# Patient Record
Sex: MALE | Race: WHITE | Employment: FULL TIME | URBAN - METROPOLITAN AREA
[De-identification: names, ages, dates, MRNs, and addresses within clinical notes are randomized per-mention and may not be internally consistent; named-entity substitution may affect disease eponyms.]

---

## 2023-03-16 ENCOUNTER — HOSPITAL ENCOUNTER (INPATIENT)
Age: 53
LOS: 1 days | Discharge: HOME | DRG: 309 | End: 2023-03-17
Attending: EMERGENCY MEDICINE | Admitting: INTERNAL MEDICINE
Payer: COMMERCIAL

## 2023-03-16 DIAGNOSIS — I48.92 ATRIAL FLUTTER, UNSPECIFIED TYPE (HCC): Primary | ICD-10-CM

## 2023-03-16 PROBLEM — I48.91 RAPID ATRIAL FIBRILLATION (HCC): Status: ACTIVE | Noted: 2023-03-16

## 2023-03-16 LAB
ABSOLUTE EOS #: 0.18 K/UL (ref 0–0.44)
ABSOLUTE IMMATURE GRANULOCYTE: 0.04 K/UL (ref 0–0.3)
ABSOLUTE LYMPH #: 1.95 K/UL (ref 1.1–3.7)
ABSOLUTE MONO #: 0.66 K/UL (ref 0.1–1.2)
ALBUMIN SERPL-MCNC: 3.6 G/DL (ref 3.5–5.2)
ALP SERPL-CCNC: 120 U/L (ref 40–129)
ALT SERPL-CCNC: 10 U/L (ref 5–41)
ANION GAP SERPL CALCULATED.3IONS-SCNC: 11 MMOL/L (ref 9–17)
AST SERPL-CCNC: 14 U/L
BASOPHILS # BLD: 1 % (ref 0–2)
BASOPHILS ABSOLUTE: 0.09 K/UL (ref 0–0.2)
BILIRUB SERPL-MCNC: 0.4 MG/DL (ref 0.3–1.2)
BUN SERPL-MCNC: 17 MG/DL (ref 6–20)
BUN/CREAT BLD: 15 (ref 9–20)
CALCIUM SERPL-MCNC: 8.9 MG/DL (ref 8.6–10.4)
CHLORIDE SERPL-SCNC: 97 MMOL/L (ref 98–107)
CO2 SERPL-SCNC: 24 MMOL/L (ref 20–31)
CREAT SERPL-MCNC: 1.16 MG/DL (ref 0.7–1.2)
D DIMER BLD IA.RAPID-MCNC: 9.65 MG/L FEU (ref 0–0.59)
EOSINOPHILS RELATIVE PERCENT: 2 % (ref 1–4)
GFR SERPL CREATININE-BSD FRML MDRD: >60 ML/MIN/1.73M2
GLUCOSE SERPL-MCNC: 394 MG/DL (ref 70–99)
HCT VFR BLD AUTO: 42.3 % (ref 40.7–50.3)
HGB BLD-MCNC: 13.8 G/DL (ref 13–17)
IMMATURE GRANULOCYTES: 0 %
LACTATE PLASV-SCNC: 2.2 MMOL/L (ref 0.5–2.2)
LYMPHOCYTES # BLD: 18 % (ref 24–43)
MAGNESIUM SERPL-MCNC: 1.9 MG/DL (ref 1.6–2.6)
MCH RBC QN AUTO: 29.6 PG (ref 25.2–33.5)
MCHC RBC AUTO-ENTMCNC: 32.6 G/DL (ref 28.4–34.8)
MCV RBC AUTO: 90.6 FL (ref 82.6–102.9)
MONOCYTES # BLD: 6 % (ref 3–12)
NRBC AUTOMATED: 0 PER 100 WBC
PDW BLD-RTO: 11.7 % (ref 11.8–14.4)
PLATELET # BLD AUTO: 280 K/UL (ref 138–453)
PMV BLD AUTO: 10.8 FL (ref 8.1–13.5)
POTASSIUM SERPL-SCNC: 4.9 MMOL/L (ref 3.7–5.3)
PROT SERPL-MCNC: 6.4 G/DL (ref 6.4–8.3)
RBC # BLD: 4.67 M/UL (ref 4.21–5.77)
SARS-COV-2 RDRP RESP QL NAA+PROBE: NOT DETECTED
SEG NEUTROPHILS: 73 % (ref 36–65)
SEGMENTED NEUTROPHILS ABSOLUTE COUNT: 8.16 K/UL (ref 1.5–8.1)
SODIUM SERPL-SCNC: 132 MMOL/L (ref 135–144)
SPECIMEN DESCRIPTION: NORMAL
T4 FREE SERPL-MCNC: 1.12 NG/DL (ref 0.93–1.7)
TROPONIN I SERPL DL<=0.01 NG/ML-MCNC: 139 NG/L (ref 0–22)
TSH SERPL-ACNC: 0.95 UIU/ML (ref 0.3–5)
WBC # BLD AUTO: 11.1 K/UL (ref 3.5–11.3)

## 2023-03-16 PROCEDURE — 93005 ELECTROCARDIOGRAM TRACING: CPT | Performed by: EMERGENCY MEDICINE

## 2023-03-16 PROCEDURE — 87635 SARS-COV-2 COVID-19 AMP PRB: CPT

## 2023-03-16 PROCEDURE — 36415 COLL VENOUS BLD VENIPUNCTURE: CPT

## 2023-03-16 PROCEDURE — 2580000003 HC RX 258: Performed by: EMERGENCY MEDICINE

## 2023-03-16 PROCEDURE — 83735 ASSAY OF MAGNESIUM: CPT

## 2023-03-16 PROCEDURE — 2500000003 HC RX 250 WO HCPCS: Performed by: EMERGENCY MEDICINE

## 2023-03-16 PROCEDURE — 84484 ASSAY OF TROPONIN QUANT: CPT

## 2023-03-16 PROCEDURE — 85025 COMPLETE CBC W/AUTO DIFF WBC: CPT

## 2023-03-16 PROCEDURE — 83605 ASSAY OF LACTIC ACID: CPT

## 2023-03-16 PROCEDURE — 84443 ASSAY THYROID STIM HORMONE: CPT

## 2023-03-16 PROCEDURE — 84439 ASSAY OF FREE THYROXINE: CPT

## 2023-03-16 PROCEDURE — 96375 TX/PRO/DX INJ NEW DRUG ADDON: CPT

## 2023-03-16 PROCEDURE — 85379 FIBRIN DEGRADATION QUANT: CPT

## 2023-03-16 PROCEDURE — 6360000002 HC RX W HCPCS: Performed by: EMERGENCY MEDICINE

## 2023-03-16 PROCEDURE — 80053 COMPREHEN METABOLIC PANEL: CPT

## 2023-03-16 PROCEDURE — 96361 HYDRATE IV INFUSION ADD-ON: CPT

## 2023-03-16 PROCEDURE — 2060000000 HC ICU INTERMEDIATE R&B

## 2023-03-16 PROCEDURE — 96374 THER/PROPH/DIAG INJ IV PUSH: CPT

## 2023-03-16 PROCEDURE — 99285 EMERGENCY DEPT VISIT HI MDM: CPT

## 2023-03-16 RX ORDER — ALBUTEROL SULFATE 2.5 MG/3ML
SOLUTION RESPIRATORY (INHALATION)
Status: ON HOLD | COMMUNITY
Start: 2023-03-16 | End: 2023-03-17

## 2023-03-16 RX ORDER — INSULIN GLARGINE 100 [IU]/ML
34 INJECTION, SOLUTION SUBCUTANEOUS NIGHTLY
COMMUNITY

## 2023-03-16 RX ORDER — DILTIAZEM HYDROCHLORIDE 5 MG/ML
20 INJECTION INTRAVENOUS ONCE
Status: COMPLETED | OUTPATIENT
Start: 2023-03-16 | End: 2023-03-16

## 2023-03-16 RX ORDER — ATORVASTATIN CALCIUM 40 MG/1
TABLET, FILM COATED ORAL
COMMUNITY

## 2023-03-16 RX ORDER — INSULIN GLARGINE 100 [IU]/ML
26 INJECTION, SOLUTION SUBCUTANEOUS EVERY MORNING
COMMUNITY
Start: 2021-02-08

## 2023-03-16 RX ORDER — 0.9 % SODIUM CHLORIDE 0.9 %
1000 INTRAVENOUS SOLUTION INTRAVENOUS ONCE
Status: COMPLETED | OUTPATIENT
Start: 2023-03-16 | End: 2023-03-16

## 2023-03-16 RX ORDER — ASPIRIN 81 MG/1
TABLET ORAL
COMMUNITY

## 2023-03-16 RX ORDER — METOPROLOL TARTRATE 50 MG/1
TABLET, FILM COATED ORAL
COMMUNITY

## 2023-03-16 RX ORDER — ONDANSETRON 2 MG/ML
4 INJECTION INTRAMUSCULAR; INTRAVENOUS ONCE
Status: COMPLETED | OUTPATIENT
Start: 2023-03-16 | End: 2023-03-16

## 2023-03-16 RX ORDER — FINERENONE 20 MG/1
TABLET, FILM COATED ORAL
COMMUNITY
Start: 2022-12-27

## 2023-03-16 RX ORDER — LISINOPRIL 5 MG/1
5 TABLET ORAL DAILY
COMMUNITY

## 2023-03-16 RX ADMIN — ONDANSETRON 4 MG: 2 INJECTION INTRAMUSCULAR; INTRAVENOUS at 19:24

## 2023-03-16 RX ADMIN — SODIUM CHLORIDE 1000 ML: 9 INJECTION, SOLUTION INTRAVENOUS at 17:33

## 2023-03-16 RX ADMIN — AMIODARONE HYDROCHLORIDE 1 MG/MIN: 50 INJECTION, SOLUTION INTRAVENOUS at 19:27

## 2023-03-16 RX ADMIN — AMIODARONE HYDROCHLORIDE 150 MG: 50 INJECTION, SOLUTION INTRAVENOUS at 19:15

## 2023-03-16 RX ADMIN — DILTIAZEM HYDROCHLORIDE 20 MG: 5 INJECTION INTRAVENOUS at 17:32

## 2023-03-16 ASSESSMENT — ENCOUNTER SYMPTOMS
NAUSEA: 0
VOMITING: 0
WHEEZING: 1

## 2023-03-16 ASSESSMENT — PAIN - FUNCTIONAL ASSESSMENT: PAIN_FUNCTIONAL_ASSESSMENT: NONE - DENIES PAIN

## 2023-03-16 NOTE — ED NOTES
ED to inpatient nurses report     Chief Complaint   Patient presents with    Chest Pain     Was being seen at PCP, started having pain and they did a EKG, was told to come here. Slight SOB    Shortness of Breath    Tachycardia      Present to ED from MD's office for tachycardia, SOB. Seen at Dr. Quinn Conte office for these complaints. HR in 140s.    LOC: alert and orientated to name, place, date  Vital signs   Vitals:    03/16/23 1718 03/16/23 1736 03/16/23 1738 03/16/23 1741   BP:       Pulse: (!) 144 (!) 149 (!) 117 95   Resp: 16 21 17 17   Temp:       TempSrc:       SpO2: 97% 98% 97% 98%   Weight:       Height:          Oxygen Baseline: none   Current needs required: none      SEPSIS: NO  [] Lactate X 2 ordered (Yes or No)  [] Antibiotics given (Yes or No)  [] IV Fluids ordered (Yes or No)             [] 2nd IV completed (Yes or No)  [] Hourly Vital Signs (Validated)  [] Outstanding Orders:     LDAs:   Peripheral IV 03/16/23 Right Forearm (Active)   Site Assessment Clean, dry & intact 03/16/23 1716   Line Status Blood return noted 03/16/23 1716     Mobility: Independent  Fall Risk:    Pending ED orders:  none  Present condition: stable  Code Status: Full    Consults: IP CONSULT TO CARDIOLOGY  []  Hospitalist  Completed  [] yes [] no Who:   []  Medicine  Completed  [] yes [] No Who:   [x]  Cardiology  Completed  [x] yes [] No Who: Donna  []  GI   Completed  [] yes [] No Who:   []  Neurology  Completed  [] yes [] No Who:   []  Nephrology Completed  [] yes [] No Who:    []  Vascular  Completed  [] yes [] No Who:   []  Ortho  Completed  [] yes [] No Who:     []  Surgery  Completed  [] yes [] No Who:    []  Urology  Completed  [] yes [] No Who:    []  CT Surgery Completed  [] yes [] No Who:   []  Podiatry  Completed  [] yes [] No Who:    []  Other    Completed  [] yes [] No Who:  Interventions:  20 mg IV Cardizem bolus, 600 mg amiodorone bolus, amiodorone drip, 4 mg IV zofran    Important Events:         Electronically signed by Aryan Alejandre RN on 3/16/2023 at 10:31 PM                Aryan Alejandre RN  03/16/23 7010

## 2023-03-17 ENCOUNTER — APPOINTMENT (OUTPATIENT)
Dept: CT IMAGING | Age: 53
DRG: 309 | End: 2023-03-17
Payer: COMMERCIAL

## 2023-03-17 VITALS
OXYGEN SATURATION: 95 % | RESPIRATION RATE: 16 BRPM | BODY MASS INDEX: 19.94 KG/M2 | HEIGHT: 75 IN | WEIGHT: 160.4 LBS | SYSTOLIC BLOOD PRESSURE: 101 MMHG | TEMPERATURE: 98.6 F | HEART RATE: 69 BPM | DIASTOLIC BLOOD PRESSURE: 64 MMHG

## 2023-03-17 PROBLEM — I48.91 RAPID ATRIAL FIBRILLATION (HCC): Status: RESOLVED | Noted: 2023-03-16 | Resolved: 2023-03-17

## 2023-03-17 PROBLEM — I48.92 ATRIAL FLUTTER (HCC): Status: ACTIVE | Noted: 2023-03-17

## 2023-03-17 PROBLEM — I48.92 ATRIAL FLUTTER (HCC): Status: RESOLVED | Noted: 2023-03-17 | Resolved: 2023-03-17

## 2023-03-17 LAB
ALBUMIN SERPL-MCNC: 3.2 G/DL (ref 3.5–5.2)
ALP SERPL-CCNC: 96 U/L (ref 40–129)
ALT SERPL-CCNC: 9 U/L (ref 5–41)
ANION GAP SERPL CALCULATED.3IONS-SCNC: 8 MMOL/L (ref 9–17)
AST SERPL-CCNC: 12 U/L
BILIRUB DIRECT SERPL-MCNC: <0.1 MG/DL
BILIRUB INDIRECT SERPL-MCNC: ABNORMAL MG/DL (ref 0–1)
BILIRUB SERPL-MCNC: 0.3 MG/DL (ref 0.3–1.2)
BUN SERPL-MCNC: 17 MG/DL (ref 6–20)
BUN/CREAT BLD: 17 (ref 9–20)
CALCIUM SERPL-MCNC: 8.5 MG/DL (ref 8.6–10.4)
CHLORIDE SERPL-SCNC: 103 MMOL/L (ref 98–107)
CO2 SERPL-SCNC: 25 MMOL/L (ref 20–31)
CREAT SERPL-MCNC: 1.01 MG/DL (ref 0.7–1.2)
EKG ATRIAL RATE: 144 BPM
EKG ATRIAL RATE: 275 BPM
EKG P AXIS: -97 DEGREES
EKG Q-T INTERVAL: 362 MS
EKG Q-T INTERVAL: 368 MS
EKG QRS DURATION: 114 MS
EKG QRS DURATION: 148 MS
EKG QTC CALCULATION (BAZETT): 420 MS
EKG QTC CALCULATION (BAZETT): 569 MS
EKG R AXIS: -40 DEGREES
EKG R AXIS: 63 DEGREES
EKG T AXIS: -67 DEGREES
EKG T AXIS: -83 DEGREES
EKG VENTRICULAR RATE: 144 BPM
EKG VENTRICULAR RATE: 81 BPM
GFR SERPL CREATININE-BSD FRML MDRD: >60 ML/MIN/1.73M2
GLUCOSE BLD-MCNC: 167 MG/DL (ref 75–110)
GLUCOSE BLD-MCNC: 191 MG/DL (ref 75–110)
GLUCOSE BLD-MCNC: 298 MG/DL (ref 75–110)
GLUCOSE BLD-MCNC: 392 MG/DL (ref 75–110)
GLUCOSE BLD-MCNC: 415 MG/DL (ref 75–110)
GLUCOSE SERPL-MCNC: 155 MG/DL (ref 70–99)
LV EF: 53 %
LVEF MODALITY: NORMAL
MAGNESIUM SERPL-MCNC: 2 MG/DL (ref 1.6–2.6)
POTASSIUM SERPL-SCNC: 4.4 MMOL/L (ref 3.7–5.3)
PROT SERPL-MCNC: 5.5 G/DL (ref 6.4–8.3)
SODIUM SERPL-SCNC: 136 MMOL/L (ref 135–144)
TROPONIN I SERPL DL<=0.01 NG/ML-MCNC: 146 NG/L (ref 0–22)
TSH SERPL-ACNC: 1.36 UIU/ML (ref 0.3–5)

## 2023-03-17 PROCEDURE — 93010 ELECTROCARDIOGRAM REPORT: CPT | Performed by: INTERNAL MEDICINE

## 2023-03-17 PROCEDURE — 80076 HEPATIC FUNCTION PANEL: CPT

## 2023-03-17 PROCEDURE — 82947 ASSAY GLUCOSE BLOOD QUANT: CPT

## 2023-03-17 PROCEDURE — 6370000000 HC RX 637 (ALT 250 FOR IP): Performed by: INTERNAL MEDICINE

## 2023-03-17 PROCEDURE — 6360000002 HC RX W HCPCS: Performed by: INTERNAL MEDICINE

## 2023-03-17 PROCEDURE — 6360000004 HC RX CONTRAST MEDICATION: Performed by: INTERNAL MEDICINE

## 2023-03-17 PROCEDURE — 84484 ASSAY OF TROPONIN QUANT: CPT

## 2023-03-17 PROCEDURE — 84443 ASSAY THYROID STIM HORMONE: CPT

## 2023-03-17 PROCEDURE — 2580000003 HC RX 258: Performed by: INTERNAL MEDICINE

## 2023-03-17 PROCEDURE — 93306 TTE W/DOPPLER COMPLETE: CPT

## 2023-03-17 PROCEDURE — 36415 COLL VENOUS BLD VENIPUNCTURE: CPT

## 2023-03-17 PROCEDURE — 83735 ASSAY OF MAGNESIUM: CPT

## 2023-03-17 PROCEDURE — 71260 CT THORAX DX C+: CPT | Performed by: INTERNAL MEDICINE

## 2023-03-17 PROCEDURE — 93005 ELECTROCARDIOGRAM TRACING: CPT | Performed by: INTERNAL MEDICINE

## 2023-03-17 PROCEDURE — 80048 BASIC METABOLIC PNL TOTAL CA: CPT

## 2023-03-17 RX ORDER — ATORVASTATIN CALCIUM 40 MG/1
40 TABLET, FILM COATED ORAL NIGHTLY
Qty: 30 TABLET | Refills: 3 | Status: SHIPPED | OUTPATIENT
Start: 2023-03-17

## 2023-03-17 RX ORDER — 0.9 % SODIUM CHLORIDE 0.9 %
80 INTRAVENOUS SOLUTION INTRAVENOUS ONCE
Status: COMPLETED | OUTPATIENT
Start: 2023-03-17 | End: 2023-03-17

## 2023-03-17 RX ORDER — ENOXAPARIN SODIUM 100 MG/ML
1 INJECTION SUBCUTANEOUS 2 TIMES DAILY
Status: DISCONTINUED | OUTPATIENT
Start: 2023-03-17 | End: 2023-03-17

## 2023-03-17 RX ORDER — ONDANSETRON 4 MG/1
4 TABLET, ORALLY DISINTEGRATING ORAL EVERY 8 HOURS PRN
Status: DISCONTINUED | OUTPATIENT
Start: 2023-03-17 | End: 2023-03-17 | Stop reason: HOSPADM

## 2023-03-17 RX ORDER — AMIODARONE HYDROCHLORIDE 200 MG/1
200 TABLET ORAL 2 TIMES DAILY
Status: DISCONTINUED | OUTPATIENT
Start: 2023-03-17 | End: 2023-03-17 | Stop reason: HOSPADM

## 2023-03-17 RX ORDER — ATORVASTATIN CALCIUM 40 MG/1
40 TABLET, FILM COATED ORAL NIGHTLY
Status: DISCONTINUED | OUTPATIENT
Start: 2023-03-17 | End: 2023-03-17 | Stop reason: HOSPADM

## 2023-03-17 RX ORDER — SODIUM CHLORIDE 0.9 % (FLUSH) 0.9 %
10 SYRINGE (ML) INJECTION PRN
Status: DISCONTINUED | OUTPATIENT
Start: 2023-03-17 | End: 2023-03-17 | Stop reason: HOSPADM

## 2023-03-17 RX ORDER — ENOXAPARIN SODIUM 100 MG/ML
1 INJECTION SUBCUTANEOUS DAILY
Status: DISCONTINUED | OUTPATIENT
Start: 2023-03-17 | End: 2023-03-17

## 2023-03-17 RX ORDER — ENOXAPARIN SODIUM 100 MG/ML
40 INJECTION SUBCUTANEOUS DAILY
Status: DISCONTINUED | OUTPATIENT
Start: 2023-03-17 | End: 2023-03-17

## 2023-03-17 RX ORDER — ASPIRIN 81 MG/1
81 TABLET ORAL DAILY
Status: DISCONTINUED | OUTPATIENT
Start: 2023-03-17 | End: 2023-03-17 | Stop reason: HOSPADM

## 2023-03-17 RX ORDER — INSULIN LISPRO 100 [IU]/ML
0-4 INJECTION, SOLUTION INTRAVENOUS; SUBCUTANEOUS NIGHTLY
Status: DISCONTINUED | OUTPATIENT
Start: 2023-03-17 | End: 2023-03-17 | Stop reason: HOSPADM

## 2023-03-17 RX ORDER — METOPROLOL TARTRATE 50 MG/1
50 TABLET, FILM COATED ORAL 2 TIMES DAILY
Qty: 60 TABLET | Refills: 3 | Status: SHIPPED | OUTPATIENT
Start: 2023-03-17

## 2023-03-17 RX ORDER — SODIUM CHLORIDE 0.9 % (FLUSH) 0.9 %
5-40 SYRINGE (ML) INJECTION EVERY 12 HOURS SCHEDULED
Status: DISCONTINUED | OUTPATIENT
Start: 2023-03-17 | End: 2023-03-17 | Stop reason: HOSPADM

## 2023-03-17 RX ORDER — METOPROLOL TARTRATE 50 MG/1
50 TABLET, FILM COATED ORAL 2 TIMES DAILY
Status: DISCONTINUED | OUTPATIENT
Start: 2023-03-17 | End: 2023-03-17

## 2023-03-17 RX ORDER — SODIUM CHLORIDE 0.9 % (FLUSH) 0.9 %
5-40 SYRINGE (ML) INJECTION PRN
Status: DISCONTINUED | OUTPATIENT
Start: 2023-03-17 | End: 2023-03-17 | Stop reason: HOSPADM

## 2023-03-17 RX ORDER — LISINOPRIL 5 MG/1
5 TABLET ORAL DAILY
Status: DISCONTINUED | OUTPATIENT
Start: 2023-03-17 | End: 2023-03-17 | Stop reason: HOSPADM

## 2023-03-17 RX ORDER — INSULIN LISPRO 100 [IU]/ML
0-8 INJECTION, SOLUTION INTRAVENOUS; SUBCUTANEOUS
Status: DISCONTINUED | OUTPATIENT
Start: 2023-03-17 | End: 2023-03-17 | Stop reason: HOSPADM

## 2023-03-17 RX ORDER — SODIUM CHLORIDE 9 MG/ML
INJECTION, SOLUTION INTRAVENOUS PRN
Status: DISCONTINUED | OUTPATIENT
Start: 2023-03-17 | End: 2023-03-17 | Stop reason: HOSPADM

## 2023-03-17 RX ORDER — ONDANSETRON 2 MG/ML
4 INJECTION INTRAMUSCULAR; INTRAVENOUS EVERY 6 HOURS PRN
Status: DISCONTINUED | OUTPATIENT
Start: 2023-03-17 | End: 2023-03-17 | Stop reason: HOSPADM

## 2023-03-17 RX ORDER — DEXTROSE MONOHYDRATE 100 MG/ML
INJECTION, SOLUTION INTRAVENOUS CONTINUOUS PRN
Status: DISCONTINUED | OUTPATIENT
Start: 2023-03-17 | End: 2023-03-17 | Stop reason: HOSPADM

## 2023-03-17 RX ORDER — METOPROLOL TARTRATE 50 MG/1
50 TABLET, FILM COATED ORAL 2 TIMES DAILY
Status: DISCONTINUED | OUTPATIENT
Start: 2023-03-17 | End: 2023-03-17 | Stop reason: HOSPADM

## 2023-03-17 RX ORDER — AMIODARONE HYDROCHLORIDE 200 MG/1
200 TABLET ORAL 2 TIMES DAILY
Qty: 28 TABLET | Refills: 0
Start: 2023-03-17

## 2023-03-17 RX ORDER — ACETAMINOPHEN 325 MG/1
650 TABLET ORAL EVERY 4 HOURS PRN
Status: DISCONTINUED | OUTPATIENT
Start: 2023-03-17 | End: 2023-03-17 | Stop reason: HOSPADM

## 2023-03-17 RX ADMIN — INSULIN LISPRO 4 UNITS: 100 INJECTION, SOLUTION INTRAVENOUS; SUBCUTANEOUS at 01:52

## 2023-03-17 RX ADMIN — AMIODARONE HYDROCHLORIDE 0.5 MG/MIN: 50 INJECTION, SOLUTION INTRAVENOUS at 07:23

## 2023-03-17 RX ADMIN — LISINOPRIL 5 MG: 5 TABLET ORAL at 09:42

## 2023-03-17 RX ADMIN — AMIODARONE HYDROCHLORIDE 200 MG: 200 TABLET ORAL at 17:30

## 2023-03-17 RX ADMIN — APIXABAN 5 MG: 5 TABLET, FILM COATED ORAL at 15:18

## 2023-03-17 RX ADMIN — IOPAMIDOL 75 ML: 755 INJECTION, SOLUTION INTRAVENOUS at 02:02

## 2023-03-17 RX ADMIN — SODIUM CHLORIDE, PRESERVATIVE FREE 10 ML: 5 INJECTION INTRAVENOUS at 02:02

## 2023-03-17 RX ADMIN — METOPROLOL TARTRATE 50 MG: 50 TABLET, FILM COATED ORAL at 09:42

## 2023-03-17 RX ADMIN — ENOXAPARIN SODIUM 70 MG: 100 INJECTION SUBCUTANEOUS at 11:53

## 2023-03-17 RX ADMIN — ASPIRIN 81 MG: 81 TABLET, COATED ORAL at 09:42

## 2023-03-17 RX ADMIN — SODIUM CHLORIDE 80 ML: 9 INJECTION, SOLUTION INTRAVENOUS at 02:02

## 2023-03-17 RX ADMIN — INSULIN LISPRO 8 UNITS: 100 INJECTION, SOLUTION INTRAVENOUS; SUBCUTANEOUS at 17:20

## 2023-03-17 RX ADMIN — ENOXAPARIN SODIUM 70 MG: 100 INJECTION SUBCUTANEOUS at 01:52

## 2023-03-17 RX ADMIN — INSULIN LISPRO 4 UNITS: 100 INJECTION, SOLUTION INTRAVENOUS; SUBCUTANEOUS at 11:52

## 2023-03-17 NOTE — PROGRESS NOTES
[x] There are one or more home medications that need clarification before Medication Reconciliation can be completed. The Med List Status has been marked as In Progress. To assist with Home Medication Reconciliation the following actions have been taken:    [x] Pharmacy medication reconciliation service requested.  (Note: This can be done by sending a Perfect Serve message to [facility] Rec Pharmacist or by Russ Hintontor [phone].)

## 2023-03-17 NOTE — PROGRESS NOTES
RN witnessed pt HR elevated on telemetry, 's. Pt resting in bed and states that he does feel palpitations at this time. No other symptoms. PO medications recently given. Pt remains on 0.5 mg/min amio gtt. Dr. Marzella Fabry paged.

## 2023-03-17 NOTE — CARE COORDINATION
Case Management Assessment  Initial Evaluation    Date/Time of Evaluation: 3/17/2023 11:59 AM  Assessment Completed by: Haseeb Portillo RN    If patient is discharged prior to next notation, then this note serves as note for discharge by case management. Patient Name: Margi Trinh                   YOB: 1970  Diagnosis: Rapid atrial fibrillation ([facility] [address] [I48.91]  Atrial flutter, unspecified type [facility]) [I48.92]                   Date / Time: 3/16/2023  4:56 PM    Patient Admission Status: Inpatient   Readmission Risk (Low < 19, Mod (19-27), High > 27): Readmission Risk Score: 10.4    Current PCP: Amari Donald MD  PCP verified by CM? Yes (LOV 3/16)    Chart Reviewed: Yes      History Provided by: Patient  Patient Orientation: Alert and Oriented    Patient Cognition: Alert    Hospitalization in the last 30 days (Readmission):  Yes    If yes, Readmission Assessment in CM Navigator will be completed. Advance Directives:      Code Status: Full Code   Patient's Primary Decision Maker is: Legal Next of Kin      Discharge Planning:    Patient lives with: Family Members Type of Home: House  Primary Care Giver: Self  Patient Support Systems include: Parent, Friends/Neighbors   Current Financial resources:    Current community resources:    Current services prior to admission: None            Current DME:              Type of Home Care services:  None    ADLS  Prior functional level: Independent in ADLs/IADLs  Current functional level: Independent in ADLs/IADLs    PT AM-PAC:   /24  OT AM-PAC:   /24    Family can provide assistance at DC: No  Would you like Case Management to discuss the discharge plan with any other family members/significant others, and if so, who?  No  Plans to Return to Present Housing: Yes  Other Identified Issues/Barriers to RETURNING to current housing: anticoagulation/ afib   Potential Assistance needed at discharge: N/A            Potential DME:    Patient expects to discharge to: [facility] for transportation at discharge:      Financial    Payor: Danny Mireles / Plan: Gera Kline / Product Type: *No Product type* /     Does insurance require precert for SNF: Yes    Potential assistance Purchasing Medications: No  Meds-to-Beds request: Yes      [facility] [address] [place] [phone] [address] [place] [place] [address]  Phone: [phone] Fax: [phone]      Notes:    Factors facilitating achievement of predicted outcomes: Motivated, Cooperative, Pleasant, and Good insight into deficits    Barriers to discharge: Medication managment    Additional Case Management Notes:   Patient lives with his 32year old son. Independent. Drives. Does not have or use any DME. Patient admitted with a fib. On amio gtt, lovenox bid. Will need to see if any AC needed on discharge. He has never filled medications with his new insurance. He will be eligible for free month and reduced co pay card if eliquis/xarelto are needed. The Plan for Transition of Care is related to the following treatment goals of Rapid atrial fibrillation ([address] [I48.91]  Atrial flutter, unspecified type ([address] [T58.17]    IF APPLICABLE: The Patient and/or patient representative Wally and his family were provided with a choice of provider and agrees with the discharge plan. Freedom of choice list with basic dialogue that supports the patient's individualized plan of care/goals and shares the quality data associated with the providers was provided to:     Patient Representative Name:       The Patient and/or Patient Representative Agree with the Discharge Plan?       Kajal Kidd RN  Case Management Department  Ph: [phone] Fax: [phone]

## 2023-03-17 NOTE — PLAN OF CARE
Patient admitted for Aflutter with average heart rate at 80 bmp. Pt remains on 0.5 mg/min amio gtt per Dr. Thompson. Pt c/o intermittent palpitations at this time. Pt states that he has not taken his medications as prescribed, including metoprolol, for the last 2 months. Pt A&O x4, ambulating independently, denies any pain and verbalizes the desire to go home this afternoon. ECHO ordered but not yet completed. VSS. Awaiting attending to round. Will continue to monitor.     Problem: Discharge Planning  Goal: Discharge to home or other facility with appropriate resources  3/17/2023 1243 by Deepika Jones RN  Outcome: Progressing  Flowsheets (Taken 3/17/2023 0754)  Discharge to home or other facility with appropriate resources: Identify discharge learning needs (meds, wound care, etc)     Problem: Cardiovascular - Adult  Goal: Maintains optimal cardiac output and hemodynamic stability  3/17/2023 1243 by Deepika Jones RN  Outcome: Progressing  Flowsheets (Taken 3/17/2023 0754)  Maintains optimal cardiac output and hemodynamic stability:   Monitor blood pressure and heart rate   Assess for signs of decreased cardiac output     Problem: Metabolic/Fluid and Electrolytes - Adult  Goal: Glucose maintained within prescribed range  3/17/2023 1243 by Deepika Jones RN  Outcome: Progressing  Flowsheets (Taken 3/17/2023 0754)  Glucose maintained within prescribed range:   Monitor blood glucose as ordered   Assess for signs and symptoms of hyperglycemia and hypoglycemia   Administer ordered medications to maintain glucose within target range     Problem: Chronic Conditions and Co-morbidities  Goal: Patient's chronic conditions and co-morbidity symptoms are monitored and maintained or improved  Outcome: Progressing  Flowsheets (Taken 3/17/2023 0754)  Care Plan - Patient's Chronic Conditions and Co-Morbidity Symptoms are Monitored and Maintained or Improved:   Monitor and assess patient's chronic conditions and comorbid  symptoms for stability, deterioration, or improvement   Update acute care plan with appropriate goals if chronic or comorbid symptoms are exacerbated and prevent overall improvement and discharge

## 2023-03-17 NOTE — PROGRESS NOTES
Pt HR ranging from 140-196 at times. Dr. Иван Khoury notified and orders in place for one time dose of amio, 150 mg bolus, IV as well as increasing continuous amio gtt from 0.5 mg/min to 1 mg/min. ECHO also ordered to be completed once patient HR is more controlled. Will continue to monitor.

## 2023-03-17 NOTE — ED PROVIDER NOTES
[address] [facility]  Emergency Medicine Department    Pt Name: Wagner Partida  MRN: [id number]  [place] 1970  Date of evaluation: 3/16/2023  Provider: Tereza Brandon MD    CHIEF COMPLAINT     Chief Complaint   Patient presents with    Chest Pain     Was being seen at PCP, started having pain and they did a EKG, was told to come here. Slight SOB    Shortness of Breath    Tachycardia       HISTORY OF PRESENT ILLNESS  (Location/Symptom, Timing/Onset, Context/Setting,Quality, Duration, Modifying Factors, Severity.)   Wally Pabon is a 46 y.o. male who presents to the emergency department After being referred by his PCP. He went to his PCP complaining of some shortness of breath and generalized fatigue. He was found to be tachycardic and was sent to the ER. He states been feeling like this for several days. Nursing Notes were reviewed. ALLERGIES     Patient has no known allergies. CURRENT MEDICATIONS       Previous Medications    ALBUTEROL (PROVENTIL) (2.5 MG/3ML) 0.083% NEBULIZER SOLUTION        ASPIRIN 81 MG EC TABLET    aspirin 81 mg tablet,delayed release   TAKE 1 TABLET BY MOUTH EVERY DAY    ATORVASTATIN (LIPITOR) 40 MG TABLET    atorvastatin 40 mg tablet   TAKE 1 TABLET BY MOUTH EVERY DAY    CITALOPRAM (CELEXA) 20 MG TABLET    Take 20 mg by mouth daily. IBUPROFEN (IBU) 800 MG TABLET    Take 1 tablet by mouth every 6 hours as needed for Pain. INSULIN ASPART (NOVOLOG) 100 UNIT/ML INJECTION PEN    insulin aspart (U-100) 100 unit/mL (3 mL) subcutaneous pen   INJECT 5-10 UNITS SUBCUTANEOUSLY THREE TIMES A DAY WITH MEALS PER SLIDING SCALE: 5 UNITS FOR -200, 10 UNITS FOR BS > 200    INSULIN GLARGINE (LANTUS SOLOSTAR) 100 UNIT/ML INJECTION PEN    Inject 25 Units into the skin 2 times daily    INSULIN GLARGINE (LANTUS) 100 UNIT/ML INJECTION VIAL    Inject into the skin nightly 34 Units PM. 26 units am    INSULIN LISPRO PROT & LISPRO (HUMALOG MIX 75/25 SC)    Inject  into the skin. INSULIN REGULAR (NOVOLIN R) 100 UNIT/ML INJECTION    Infuse intravenously    KERENDIA 20 MG TABS    TAKE 1 TABLET BY MOUTH IN THE MORNING    LISINOPRIL (PRINIVIL;ZESTRIL) 5 MG TABLET    Take 5 mg by mouth daily    METOPROLOL TARTRATE (LOPRESSOR) 50 MG TABLET    metoprolol tartrate 50 mg tablet   TAKE 1 TABLET BY MOUTH TWICE DAILY       PAST MEDICAL HISTORY         Diagnosis Date    Diabetes mellitus (HCC)     MRSA (methicillin resistant staph aureus) culture positive 10/28/2013    finger       SURGICAL HISTORY           Procedure Laterality Date    CARDIAC SURGERY  02/2021    Quad Bypass       FAMILY HISTORY     No family history on file. No family status information on file. SOCIAL HISTORY      reports that he has been smoking cigarettes. He has been smoking an average of 1 pack per day. He does not have any smokeless tobacco history on file. He reports that he does not drink alcohol and does not use drugs. REVIEW OF SYSTEMS    (2-9 systems for level 4, 10 or more for level 5)     Review of Systems   Constitutional:  Negative for fever. Respiratory:  Positive for wheezing. Cardiovascular:  Positive for palpitations. Gastrointestinal:  Negative for nausea and vomiting. Allergic/Immunologic: Negative for immunocompromised state. Neurological:  Positive for light-headedness. PHYSICAL EXAM    (up to 7 for level 4, 8 or more for level 5)     ED Triage Vitals [03/16/23 1648]   BP Temp Temp Source Heart Rate Resp SpO2 Height Weight   104/78 97.8 °F (36.6 °C) Oral (!) 140 20 98 % 6' 3\" (1.905 m) 160 lb (72.6 kg)       Physical Exam  Vitals and nursing note reviewed. Constitutional:       General: He is not in acute distress. Appearance: He is well-developed. He is not diaphoretic. HENT:      Head: Normocephalic and atraumatic. Nose: Nose normal.      Mouth/Throat:      Mouth: Mucous membranes are moist.   Eyes:      Extraocular Movements: Extraocular movements intact. Cardiovascular:      Rate and Rhythm: Regular rhythm. Tachycardia present. Heart sounds: Normal heart sounds. No murmur heard. Pulmonary:      Effort: Pulmonary effort is normal. No respiratory distress. Breath sounds: Normal breath sounds. No wheezing. Abdominal:      Palpations: Abdomen is soft. Tenderness: There is no abdominal tenderness. Musculoskeletal:         General: No tenderness or deformity. Normal range of motion. Cervical back: Normal range of motion and neck supple. Skin:     General: Skin is warm and dry. Neurological:      Mental Status: He is alert and oriented to person, place, and time. Psychiatric:         Behavior: Behavior normal.         Thought Content: Thought content normal.         Judgment: Judgment normal.       DIAGNOSTIC RESULTS     EKG:All EKG's are interpreted by the Emergency Department Physician who either signs or Co-signs this chart in the absence of a cardiologist.    Interpretation: Normal sinus rhythm with a rate of 144. Left axis deviation. Wide QRS. No ST elevations.     RADIOLOGY:   Non-plain film images such as CT, Ultrasound and MRI are read by theradiologist. Plain radiographic images are visualized and preliminarily interpreted by the emergency physician with the below findings:    Interpretation per the Radiologist below, if available at the time of this note:    None indicated    ED BEDSIDE ULTRASOUND:   Performed by ED Physician - none    LABS:  Labs Reviewed   CBC WITH AUTO DIFFERENTIAL - Abnormal; Notable for the following components:       Result Value    RDW 11.7 (*)     Seg Neutrophils 73 (*)     Lymphocytes 18 (*)     Segs Absolute 8.16 (*)     All other components within normal limits   TROPONIN - Abnormal; Notable for the following components:    Troponin, High Sensitivity 139 (*)     All other components within normal limits   COMPREHENSIVE METABOLIC PANEL - Abnormal; Notable for the following components:    Glucose 394 (*)     Sodium 132 (*)     Chloride 97 (*)     All other components within normal limits   D-DIMER, QUANTITATIVE - Abnormal; Notable for the following components:    D-Dimer, Quant 9.65 (*)     All other components within normal limits   COVID-19, RAPID   TSH   T4, FREE   MAGNESIUM   LACTIC ACID       All other labs were within normal range or not returned as of this dictation. EMERGENCYDEPARTMENT COURSE and DIFFERENTIAL DIAGNOSIS/MDM:   Vitals:    Vitals:    03/16/23 1741 03/16/23 1945 03/16/23 2000 03/16/23 2002   BP:  128/85 121/70    Pulse: 95 97  86   Resp: 17 16  18   Temp:       TempSrc:       SpO2: 98% 98%  98%   Weight:       Height:         26-year-old male presenting with lightheadedness and palpitations. He had been seen by his cardiologist earlier today and was referred to the ER. He is found to be tachycardic into the 140s with an EKG that appears to show a tachyarrhythmia that is nonspecific but does appear regular. Will treat with a dose of diltiazem to slow the rate to get a better idea of the underlying rhythm. We will check labs including electrolytes and troponin as well. We will contact the patient's cardiologist, Dr. Nas Powers to determine appropriate treatment and disposition. Case discussed with Dr. Nas Powers who would like the patient admitted to his service and recommends a D-dimer be ordered as well. 1)  Number and Complexity of Problems  Problem List This Visit: Weakness, palpitations, fatigue     Differential Diagnosis: ACS, A-fib, atrial flutter, SVT    Pertinent Comorbid Conditions: Coronary artery disease     2)  Data Reviewed    Decision Rules/Scores utilized:  N/A    External Documents Reviewed: I have independently reviewed patient's previous medical records including labs, notes and imaging. Imaging that is independently reviewed and interpreted by me as:  n/a     See more data below for the lab and radiology tests and orders.      3)  Treatment and Disposition     Patient repeat assessment: Heart rate improved. Rhythm appears to be atrial flutter. Disposition discussion with patient/family: Patient aware and agrees with disposition plan. Case discussed with consulting clinician: Dr. Thania Colorado, patient's cardiologist     MIPS: N/A     Social determinants of health impacting treatment or disposition:  None    Code Status Discussion:  Not discussed    CONSULTS:  IP CONSULT TO CARDIOLOGY    PROCEDURES:  N/A    FINAL IMPRESSION     1.  Atrial flutter, unspecified type [facility])       DISPOSITION/PLAN   DISPOSITION Admitted 03/16/2023 08:42:48 PM    (Please note that portions of this note were completed with a voice recognition program.  Efforts were made to edit the dictations but occasionally words are mis-transcribed.)    Wes Ryan MD  Attending Emergency Physician          Wes Ryan MD  03/18/23 4421

## 2023-03-17 NOTE — PROGRESS NOTES
Upon assessment of telemetry strip, patient true HR with caliper measurements and pulse check is  at this time. Lead wires checked on patients person for proper placement and lead readings adjusted on monitor at nurses station. Telemetry had been picking up flutter waves as beats creating a false reading for HR. Pt HR remains Aflutter with HR in the 90's at this time. No changes to amio gtt. Pt remains on 0.5 mg/min rate. ECHO order remains in place. Will discuss further with Dr. Devante España during rounds.

## 2023-03-17 NOTE — PROGRESS NOTES
Pt HR ranging from 140-196 at times. Dr. Cuauhtemoc Barnes notified and orders in place for one time dose of amio, 150 mg bolus, IV as well as increasing continuous amio gtt from 0.5 mg/min to 1 mg/min. ECHO also ordered to be completed once patient HR is more controlled. Will continue to monitor.

## 2023-03-17 NOTE — PROGRESS NOTES
RN witnessed pt HR elevated on telemetry, 's. Pt resting in bed and states that he does feel palpitations at this time. No other symptoms. PO medications recently given. Pt remains on 0.5 mg/min amio gtt. Dr. Jese scanlon.

## 2023-03-17 NOTE — PLAN OF CARE
Pt alert and oriented. VSS. A flutter on tele controlled in the 80s. SpO2 mid 90s on RA. Afebrile. No c/o pain. Amio gtt running at 0.5mg/min. Awaiting CT results. Sugars controlled; 415 to 191. Cardiology eval today and restart home meds. Will d/c home once medically stable. Bed in low position, call light within reach. Will continue to monitor.       Problem: Discharge Planning  Goal: Discharge to home or other facility with appropriate resources  Outcome: Progressing     Problem: Cardiovascular - Adult  Goal: Maintains optimal cardiac output and hemodynamic stability  Outcome: Progressing  Goal: Absence of cardiac dysrhythmias or at baseline  Outcome: Progressing     Problem: Metabolic/Fluid and Electrolytes - Adult  Goal: Glucose maintained within prescribed range  Outcome: Progressing

## 2023-03-17 NOTE — H&P
Cardiovascular Consult Note     TODAY'S DATE: 3/17/2023    Patient name: Cady Soto   YOB: 1970  Date of admission:  3/16/2023       Patient seen, examined. Previous clinical entries reviewed. All available laboratory, imaging and ancillary data reviewed. Reason for admission: Atrial flutter. Referring Physician: Garrick Mims MD    History of present Illness:     Cady Soto is a 46 y.o. male with past medical history significant for coronary artery disease and bypass surgery about a year ago who presented to [facility] with complaints of palpitations and dizziness. In the emergency room, he was noted to be in atrial flutter with rapid ventricular response. His troponin was nonspecifically elevated. He was started on a diltiazem drip in the emergency room and his heart rate was controlled. he was admitted for further care. His routine blood work was negative for any electrolyte abnormalities. He did have a D-dimer checked due to his new onset flutter. He was elevated. He did have a CT scan of the chest which showed no evidence of any acute pulmonary embolism. He was started on Lovenox for anticoagulation acutely. He was also switched to amiodarone with a normal protocol for consideration of getting him back into sinus rhythm. At this time, he is on amiodarone drip and still in atrial flutter with a controlled rate. Past Medical History:    has a past medical history of Diabetes mellitus ([address] and MRSA (methicillin resistant staph aureus) culture positive.     Surgical History:     Past Surgical History:   Procedure Laterality Date    CARDIAC SURGERY  02/2021    Quad Bypass       Medications:   Scheduled Meds:   sodium chloride flush  5-40 mL IntraVENous 2 times per day    insulin lispro  0-8 Units SubCUTAneous TID WC    insulin lispro  0-4 Units SubCUTAneous Nightly    aspirin  81 mg Oral Daily    atorvastatin  40 mg Oral Nightly    lisinopril  5 mg Oral Daily    metoprolol tartrate  50 mg Oral BID    apixaban  5 mg Oral BID     Continuous Infusions:   sodium chloride      dextrose      amiodarone 1 mg/min (03/17/23 1327)      Outpatient Medications Marked as Taking for the 3/16/23 encounter [facility] HOSPITAL Encounter)   Medication Sig Dispense Refill    insulin glargine (LANTUS SOLOSTAR) 100 UNIT/ML injection pen Inject 25 Units into the skin 2 times daily      insulin glargine (LANTUS) 100 UNIT/ML injection vial Inject into the skin nightly 34 Units PM. 26 units am         Allergies:   Patient has no known allergies. Social History:    reports that he has been smoking cigarettes. He has been smoking an average of 1 pack per day. He does not have any smokeless tobacco history on file. He reports that he does not drink alcohol and does not use drugs. Family History:    family history is not on file. Review of Systems:     Constitutional: No fever/chills. HENT: No headache, neck pain or neck stiffnes. No sore throat or dysphagia. Eyes: No blurred vision. Respiratory: As above. Cardiovascular: As above. Gastrointestinal: Negative. Genitourinary: Negative  Endocrine: Negative. Musculoskeletal: Negative. Skin: Negative. Allergic/Immunologic: Negative. Neurologic: Negative. Hematological: Negative. Psychiatric: Negative. All other systems are are noted to be otherwise negative. Physical Exam:   /65   Pulse 82   Temp 98.1 °F (36.7 °C) (Oral)   Resp 16   Ht 6' 3\" (1.905 m)   Wt 160 lb 6.4 oz (72.8 kg)   SpO2 97%   BMI 20.05 kg/m²     Intake/Output Summary (Last 24 hours) at 3/17/2023 1337  Last data filed at 3/17/2023 0900  Gross per 24 hour   Intake 480 ml   Output --   Net 480 ml       GENERAL:  Alert, appropriate, oriented, in NAD. HEENT:  Head is atraumatic and normocephalic. No Pallor. No icterus. NECK: Supple without any thyromegaly. LUNGS: Generally decreased breath sounds. CARDIAC: S1, S2,  No S3. ABD:  Soft non-tender .   EXT: No edema.  MS: No obvious deformities. SKIN: No obvious skin rashes.  NEURO: No focal neurologic deficits      Labs/ Ancillary data:     CBC:   Recent Labs     03/16/23 1710   WBC 11.1   HGB 13.8        BMP:    Recent Labs     03/16/23 1710 03/17/23  0543   * 136   K 4.9 4.4   CL 97* 103   CO2 24 25   BUN 17 17   CREATININE 1.16 1.01   GLUCOSE 394* 155*     Hepatic:   Recent Labs     03/16/23  1710 03/17/23  0543   AST 14 12   ALT 10 9   BILITOT 0.4 0.3   ALKPHOS 120 96         Imaging:    CT:  No evidence of PE.    Echo: Pending    EKG: Atrial flutter    Impression :     Atrial flutter  Troponin elevation-most likely from demand ischemia from atrial flutter with rapid ventricular response.  Coronary disease with history of bypass surgery.  Hypertension.  Hyperlipidemia.  Medication noncompliance.    Plan :     We will load patient with IV amiodarone.  Start on Eliquis.  Get a two-dimensional echocardiogram.  Hopeful discharge on p.o. amiodarone.  Discussed with patient about the importance of medication compliance.  We will send prescription refill for all of his medications.    Thank you very much for allowing us to participate in the care of this patient.    Please call us with any questions.    Electronically signed by Gareth Thompson MD on 3/17/2023 at 1:37 PM

## 2023-03-17 NOTE — DISCHARGE SUMMARY
Discharge Summary    Lilian Bautista  :  1970  MRN:  [id number]    ADMIT DATE:  3/16/2023  DISCHARGE DATE:  3/17/2023    PRIMARY CARE PHYSICIAN:  Oxana Truong MD    VISIT STATUS: Observation    CODE STATUS:  Full Code    DISCHARGE DIAGNOSES: Atrial flutter, nonspecific troponin elevation, coronary disease with history of bypass surgery, hypertension, hyperlipidemia, diabetes mellitus. HOSPITAL COURSE:  Patient is admitted for atrial flutter with rapid ventricular response. He was started on an amiodarone drip for rate control. He was started on Eliquis and was discharged in a stable condition to go home. His echocardiogram showed low normal left ventricular function with mild inferior wall hypokinesis which is similar to his previous echocardiogram.  No significant valvular abnormalities were noted. No significant pericardial effusion was noted. New medications include amiodarone 200 mg twice daily and Eliquis 5 mg twice daily. This is in addition to his normally scheduled medications of metoprolol 50 mg twice daily, lisinopril 5 mg daily, atorvastatin 40 mg daily and diabetic medications. SIGNIFICANT DIAGNOSTIC STUDIES:  CT scan of the chest showed no evidence of pulmonary embolism. Echocardiogram showed low normal left ventricular ejection fraction. RECOMMENDED NEXT STEPS:   IV loading of amiodarone in the hospital, followed by p.o. amiodarone for 14 days. Follow-up in our office in 1 week.      DISCHARGE MEDICATIONS:         Medication List        START taking these medications      albuterol (2.5 MG/3ML) 0.083% nebulizer solution  Commonly known as: PROVENTIL     amiodarone 200 MG tablet  Commonly known as: CORDARONE  Take 1 tablet by mouth 2 times daily     apixaban 5 MG Tabs tablet  Commonly known as: ELIQUIS  Take 1 tablet by mouth 2 times daily            CHANGE how you take these medications      * atorvastatin 40 MG tablet  Commonly known as: LIPITOR  What changed: Another medication with the same name was added. Make sure you understand how and when to take each. * atorvastatin 40 MG tablet  Commonly known as: LIPITOR  Take 1 tablet by mouth nightly  What changed: You were already taking a medication with the same name, and this prescription was added. Make sure you understand how and when to take each. * metoprolol tartrate 50 MG tablet  Commonly known as: LOPRESSOR  What changed: Another medication with the same name was added. Make sure you understand how and when to take each. * metoprolol tartrate 50 MG tablet  Commonly known as: LOPRESSOR  Take 1 tablet by mouth 2 times daily  What changed: You were already taking a medication with the same name, and this prescription was added. Make sure you understand how and when to take each. * This list has 4 medication(s) that are the same as other medications prescribed for you. Read the directions carefully, and ask your doctor or other care provider to review them with you. CONTINUE taking these medications      aspirin 81 MG EC tablet     insulin aspart 100 UNIT/ML injection pen  Commonly known as: NovoLOG     Kerendia 20 MG Tabs  Generic drug: Finerenone     * insulin glargine 100 UNIT/ML injection vial  Commonly known as: LANTUS     * Lantus SoloStar 100 UNIT/ML injection pen  Generic drug: insulin glargine     lisinopril 5 MG tablet  Commonly known as: PRINIVIL;ZESTRIL           * This list has 2 medication(s) that are the same as other medications prescribed for you. Read the directions carefully, and ask your doctor or other care provider to review them with you.                 STOP taking these medications      ibuprofen 800 MG tablet  Commonly known as: IBU            ASK your doctor about these medications      citalopram 20 MG tablet  Commonly known as: CELEXA     HUMALOG MIX 75/25 SC     insulin regular 100 UNIT/ML injection  Commonly known as: HUMULIN R;NOVOLIN R               Where to Get Your Medications        You can get these medications from any pharmacy    Bring a paper prescription for each of these medications  apixaban 5 MG Tabs tablet  atorvastatin 40 MG tablet  metoprolol tartrate 50 MG tablet       Information about where to get these medications is not yet available    Ask your nurse or doctor about these medications  amiodarone 200 MG tablet         DIET: ADULT DIET; Regular    ACTIVITY: No heavy lifting.  ______________________________________________________________________  COMPLEXITY OF FOLLOW UP:   [x] Moderate Complexity: follow up within 7-14 calendar days (28673)   [] Severe Complexity: follow up within 7 calendar days (15488)    FOLLOW UP TESTING, PENDING RESULTS OR REFERRALS AT TRANSITIONAL CARE VISIT:   []  Yes    [x]  No    PENDING STUDIES:   None    DISPOSITION: Home      Follow up with Porsha Clarke MD  [address] [id number]    Follow up      Filipe Medrano, [place] [phone]    Follow up     In 2 weeks. INSTRUCTIONS TO MA/SW: Please call patient on day after discharge (must document patient  contacted within 2 business days of discharge). FOLLOW UP QUESTIONS FOR MA/SW:  1. Did you get medications filled and taking them as instructed from discharge? 2. Are you following your discharge instructions from your hospital stay? 3. Please confirm patient is scheduled for a follow up appointment within the above time frame.     DISCHARGE TIME: < 30 minutes    SIGNED:  Filipe Medrano MD   3/17/2023, 2:02 PM

## 2023-03-17 NOTE — CARE COORDINATION
Discharge planning    DR Thompson up and will be ordering eliquis bid. Rx with free month voucher brought to pharmacy. Gave reduced co pay card to patient and explained to call number to enroll.

## 2023-03-17 NOTE — PROGRESS NOTES
All patient belongings collected. IV and telemetry removed. All discharge paperwork given and explained to patient. See discharge event for further details.

## 2023-03-17 NOTE — PROGRESS NOTES
Pt arrived from ED via stretcher to 1004. VS taken and tele applied. Orders released and admission requirements completed. Will continue to monitor.

## 2023-03-17 NOTE — DISCHARGE INSTRUCTIONS
YOU ARE BEING STARTED ON ELIQUIS FOR YOUR ATRIAL FIBRILLATION/FLUTTER     1. Your initial prescription  is waiting for you FOR FREE at the [facility]. 2. You need to take one 5mg tablet TWICE DAILY. You have a script for refills and a reduced copay card. Please call the [phone] number on card to enroll in program.  They will ask you for your name, address, phone. Dr Haseeb Osorio is cardiologist who is writing the order. Diagnosis is Atrial flutter. Jamil Benedict

## 2023-03-20 LAB
EKG ATRIAL RATE: 275 BPM
EKG P AXIS: -97 DEGREES
EKG Q-T INTERVAL: 362 MS
EKG QRS DURATION: 114 MS
EKG QTC CALCULATION (BAZETT): 420 MS
EKG R AXIS: 63 DEGREES
EKG T AXIS: -67 DEGREES
EKG VENTRICULAR RATE: 81 BPM

## 2023-03-20 PROCEDURE — 93010 ELECTROCARDIOGRAM REPORT: CPT | Performed by: INTERNAL MEDICINE

## 2023-03-22 NOTE — PROGRESS NOTES
Physician Progress Note      Elly COATS #:                  [id number]  :                       1970  ADMIT DATE:       3/16/2023 4:56 PM  [address] [place] DATE:        3/17/2023 7:00 PM  RESPONDING  PROVIDER #:        Blanco Markham MD          QUERY TEXT:    Patient admitted atrial flutter with RVR and has been started on  Eliquis. If possible, please document in progress notes and discharge summary if you   are evaluating and/or treating any of the following: The medical record reflects the following:  Risk Factors: CAD/CABG, hyperlipidemia, DM, hypertension  Clinical Indicators: new onset atrial flutter in male pt with history of CAD,   DM, hypertension  Treatment: Cotyquis    Wyatt Chao, MSN, RN, CCDS, [address] [place] [address]   [phone]  . Options provided:  -- Secondary hypercoagulable state in a patient with atrial flutter  -- Secondary hypercoagulable state ruled out  -- Other - I will add my own diagnosis  -- Disagree - Not applicable / Not valid  -- Disagree - Clinically unable to determine / Unknown  -- Refer to Clinical Documentation Reviewer    PROVIDER RESPONSE TEXT:    Provider disagreed with this query.     Query created by: Isa Rey on 3/20/2023 3:23 PM      Electronically signed by:  Blanco Markham MD 3/22/2023 8:25 AM

## 2023-07-21 ENCOUNTER — HOSPITAL ENCOUNTER (OUTPATIENT)
Age: 53
Setting detail: SPECIMEN
Discharge: HOME | End: 2023-07-21

## 2023-07-21 LAB
ANION GAP SERPL CALCULATED.3IONS-SCNC: 14 MMOL/L (ref 9–17)
BUN SERPL-MCNC: 21 MG/DL (ref 6–20)
CALCIUM SERPL-MCNC: 8.5 MG/DL (ref 8.6–10.4)
CHLORIDE SERPL-SCNC: 103 MMOL/L (ref 98–107)
CO2 SERPL-SCNC: 22 MMOL/L (ref 20–31)
CREAT SERPL-MCNC: 1.2 MG/DL (ref 0.7–1.2)
GFR SERPL CREATININE-BSD FRML MDRD: >60 ML/MIN/1.73M2
GLUCOSE SERPL-MCNC: 184 MG/DL (ref 70–99)
POTASSIUM SERPL-SCNC: 4.8 MMOL/L (ref 3.7–5.3)
SODIUM SERPL-SCNC: 139 MMOL/L (ref 135–144)

## 2023-07-23 LAB
EST. AVERAGE GLUCOSE BLD GHB EST-MCNC: 255 MG/DL
HBA1C MFR BLD: 10.5 % (ref 4–6)

## 2023-09-21 ENCOUNTER — HOSPITAL ENCOUNTER (OUTPATIENT)
Age: 53
Setting detail: SPECIMEN
Discharge: HOME | End: 2023-09-21

## 2023-09-21 LAB
BASOPHILS # BLD: 0.1 K/UL (ref 0–0.2)
BASOPHILS NFR BLD: 1 % (ref 0–2)
CHOLEST SERPL-MCNC: 123 MG/DL
CHOLESTEROL/HDL RATIO: 3.4
CREAT UR-MCNC: 95.1 MG/DL (ref 39–259)
EOSINOPHIL # BLD: 0.43 K/UL (ref 0–0.44)
EOSINOPHILS RELATIVE PERCENT: 6 % (ref 1–4)
ERYTHROCYTE [DISTWIDTH] IN BLOOD BY AUTOMATED COUNT: 12.2 % (ref 11.8–14.4)
EST. AVERAGE GLUCOSE BLD GHB EST-MCNC: 229 MG/DL
HBA1C MFR BLD: 9.6 % (ref 4–6)
HCT VFR BLD AUTO: 41.7 % (ref 40.7–50.3)
HDLC SERPL-MCNC: 36 MG/DL
HGB BLD-MCNC: 13.2 G/DL (ref 13–17)
IMM GRANULOCYTES # BLD AUTO: 0.03 K/UL (ref 0–0.3)
IMM GRANULOCYTES NFR BLD: 0 %
LDLC SERPL CALC-MCNC: 70 MG/DL (ref 0–130)
LYMPHOCYTES NFR BLD: 1.76 K/UL (ref 1.1–3.7)
LYMPHOCYTES RELATIVE PERCENT: 23 % (ref 24–43)
MCH RBC QN AUTO: 30.2 PG (ref 25.2–33.5)
MCHC RBC AUTO-ENTMCNC: 31.7 G/DL (ref 28.4–34.8)
MCV RBC AUTO: 95.4 FL (ref 82.6–102.9)
MICROALBUMIN UR-MCNC: 1374 MG/L
MICROALBUMIN/CREAT UR-RTO: 1445 MCG/MG CREAT
MONOCYTES NFR BLD: 0.73 K/UL (ref 0.1–1.2)
MONOCYTES NFR BLD: 10 % (ref 3–12)
NEUTROPHILS NFR BLD: 60 % (ref 36–65)
NEUTS SEG NFR BLD: 4.6 K/UL (ref 1.5–8.1)
NRBC BLD-RTO: 0 PER 100 WBC
PLATELET # BLD AUTO: 269 K/UL (ref 138–453)
PMV BLD AUTO: 10.7 FL (ref 8.1–13.5)
PSA SERPL-MCNC: 0.53 NG/ML
RBC # BLD AUTO: 4.37 M/UL (ref 4.21–5.77)
TRIGL SERPL-MCNC: 86 MG/DL
TSH SERPL DL<=0.05 MIU/L-ACNC: 1.25 UIU/ML (ref 0.3–5)
WBC OTHER # BLD: 7.7 K/UL (ref 3.5–11.3)

## 2023-09-22 LAB — C PEPTIDE SERPL-MCNC: 0.8 NG/ML (ref 1.1–4.4)

## 2023-09-25 LAB — HUMAN INSULIN ANTIBODY: <0.4 U/ML (ref 0–0.4)

## 2024-04-16 ENCOUNTER — HOSPITAL ENCOUNTER (OUTPATIENT)
Age: 54
Setting detail: SPECIMEN
Discharge: HOME | End: 2024-04-16

## 2024-04-16 LAB
25(OH)D3 SERPL-MCNC: 29.6 NG/ML (ref 30–100)
ANION GAP SERPL CALCULATED.3IONS-SCNC: 12 MMOL/L (ref 9–16)
BUN SERPL-MCNC: 14 MG/DL (ref 6–20)
CALCIUM SERPL-MCNC: 8.8 MG/DL (ref 8.6–10.4)
CHLORIDE SERPL-SCNC: 101 MMOL/L (ref 98–107)
CO2 SERPL-SCNC: 25 MMOL/L (ref 20–31)
CREAT SERPL-MCNC: 1 MG/DL (ref 0.7–1.2)
ERYTHROCYTE [DISTWIDTH] IN BLOOD BY AUTOMATED COUNT: 12.1 % (ref 11.8–14.4)
EST. AVERAGE GLUCOSE BLD GHB EST-MCNC: 232 MG/DL
GFR SERPL CREATININE-BSD FRML MDRD: 89 ML/MIN/1.73M2
GLUCOSE P FAST SERPL-MCNC: 371 MG/DL (ref 74–99)
HBA1C MFR BLD: 9.7 % (ref 4–6)
HCT VFR BLD AUTO: 37.9 % (ref 40.7–50.3)
HGB BLD-MCNC: 12.5 G/DL (ref 13–17)
IRON SERPL-MCNC: 57 UG/DL (ref 61–157)
MCH RBC QN AUTO: 30 PG (ref 25.2–33.5)
MCHC RBC AUTO-ENTMCNC: 33 G/DL (ref 28.4–34.8)
MCV RBC AUTO: 90.9 FL (ref 82.6–102.9)
NRBC BLD-RTO: 0 PER 100 WBC
PLATELET # BLD AUTO: 322 K/UL (ref 138–453)
PMV BLD AUTO: 10.5 FL (ref 8.1–13.5)
POTASSIUM SERPL-SCNC: 4.2 MMOL/L (ref 3.7–5.3)
RBC # BLD AUTO: 4.17 M/UL (ref 4.21–5.77)
SODIUM SERPL-SCNC: 138 MMOL/L (ref 136–145)
WBC OTHER # BLD: 9.1 K/UL (ref 3.5–11.3)

## 2025-08-28 ENCOUNTER — HOSPITAL ENCOUNTER (OUTPATIENT)
Age: 55
Setting detail: SPECIMEN
Discharge: HOME | End: 2025-08-28

## 2025-08-28 LAB
25(OH)D3 SERPL-MCNC: 15.4 NG/ML (ref 30–100)
ALBUMIN SERPL-MCNC: 3.7 G/DL (ref 3.5–5.2)
ALBUMIN/GLOB SERPL: 1.3 {RATIO} (ref 1–2.5)
ALP SERPL-CCNC: 148 U/L (ref 40–129)
ALT SERPL-CCNC: 10 U/L (ref 10–50)
ANION GAP SERPL CALCULATED.3IONS-SCNC: 12 MMOL/L (ref 9–16)
AST SERPL-CCNC: 15 U/L (ref 10–50)
BASOPHILS # BLD: 0.1 K/UL (ref 0–0.2)
BASOPHILS NFR BLD: 1 % (ref 0–2)
BILIRUB DIRECT SERPL-MCNC: 0.1 MG/DL (ref 0–0.2)
BILIRUB INDIRECT SERPL-MCNC: 0.1 MG/DL (ref 0–1)
BILIRUB SERPL-MCNC: 0.2 MG/DL (ref 0–1.2)
BUN SERPL-MCNC: 14 MG/DL (ref 6–20)
CALCIUM SERPL-MCNC: 8.9 MG/DL (ref 8.6–10.4)
CHLORIDE SERPL-SCNC: 98 MMOL/L (ref 98–107)
CHOLEST SERPL-MCNC: 190 MG/DL (ref 0–199)
CHOLESTEROL/HDL RATIO: 5.8
CO2 SERPL-SCNC: 24 MMOL/L (ref 20–31)
CREAT SERPL-MCNC: 1 MG/DL (ref 0.7–1.2)
EOSINOPHIL # BLD: 0.33 K/UL (ref 0–0.44)
EOSINOPHILS RELATIVE PERCENT: 4 % (ref 1–4)
ERYTHROCYTE [DISTWIDTH] IN BLOOD BY AUTOMATED COUNT: 12.1 % (ref 11.8–14.4)
GFR, ESTIMATED: 89 ML/MIN/1.73M2
GLUCOSE SERPL-MCNC: 449 MG/DL (ref 74–99)
HCT VFR BLD AUTO: 43 % (ref 40.7–50.3)
HDLC SERPL-MCNC: 33 MG/DL
HGB BLD-MCNC: 14.1 G/DL (ref 13–17)
IMM GRANULOCYTES # BLD AUTO: <0.03 K/UL (ref 0–0.3)
IMM GRANULOCYTES NFR BLD: 0 %
LDLC SERPL CALC-MCNC: 121 MG/DL (ref 0–100)
LYMPHOCYTES NFR BLD: 1.98 K/UL (ref 1.1–3.7)
LYMPHOCYTES RELATIVE PERCENT: 21 % (ref 24–43)
MCH RBC QN AUTO: 30.1 PG (ref 25.2–33.5)
MCHC RBC AUTO-ENTMCNC: 32.8 G/DL (ref 28.4–34.8)
MCV RBC AUTO: 91.7 FL (ref 82.6–102.9)
MONOCYTES NFR BLD: 0.75 K/UL (ref 0.1–1.2)
MONOCYTES NFR BLD: 8 % (ref 3–12)
NEUTROPHILS NFR BLD: 66 % (ref 36–65)
NEUTS SEG NFR BLD: 6.1 K/UL (ref 1.5–8.1)
NRBC BLD-RTO: 0 PER 100 WBC
PLATELET # BLD AUTO: 282 K/UL (ref 138–453)
PMV BLD AUTO: 11.1 FL (ref 8.1–13.5)
POTASSIUM SERPL-SCNC: 4.5 MMOL/L (ref 3.7–5.3)
PROT SERPL-MCNC: 6.5 G/DL (ref 6.6–8.7)
PSA SERPL-MCNC: 0.62 NG/ML (ref 0–4)
RBC # BLD AUTO: 4.69 M/UL (ref 4.21–5.77)
SODIUM SERPL-SCNC: 134 MMOL/L (ref 136–145)
TRIGL SERPL-MCNC: 178 MG/DL (ref 0–149)
VLDLC SERPL CALC-MCNC: 36 MG/DL (ref 1–30)
WBC OTHER # BLD: 9.3 K/UL (ref 3.5–11.3)